# Patient Record
Sex: FEMALE
[De-identification: names, ages, dates, MRNs, and addresses within clinical notes are randomized per-mention and may not be internally consistent; named-entity substitution may affect disease eponyms.]

---

## 2021-04-21 ENCOUNTER — NURSE TRIAGE (OUTPATIENT)
Dept: OTHER | Facility: CLINIC | Age: 66
End: 2021-04-21

## 2021-04-21 NOTE — TELEPHONE ENCOUNTER
Brief description of triage: Pt reports having laser lithotripsy on 3/22/21. States she has lump that appeared on left side below left breast after procedure. States Urologist has been dismissive and she has been in contact with PCP, but unable to get appt. States pain is 9/10. States she is having trouble urinating completely and is having to get in the shower with warm water to fully urinate. States she is using a heating pad for mild relief of lump on left side. Triage indicates for patient to contact PCP to be seen today or go to ER. Care advice provided, patient verbalizes understanding; denies any other questions or concerns; instructed to call back for any new or worsening symptoms. This triage is a result of a call to [address] [place] [address]. Please do not respond to the triage nurse through this encounter. Any subsequent communication should be directly with the patient. Reason for Disposition   Patient sounds very sick or weak to the triager    Answer Assessment - Initial Assessment Questions  1. SYMPTOM: \"What's the main symptom you're concerned about? \" (e.g., frequency, incontinence)      Pea size mass approx 4 \" below left breast that appeared after lithotripsy. 2. ONSET: \"When did the mass start? \"      3/22/21    3. PAIN: \"Is there any pain? \" If so, ask: \"How bad is it? \" (Scale: 1-10; mild, moderate, severe)      9/10    4. CAUSE: \"What do you think is causing the symptoms? \"      Unsure if related to problems with lithotripsy. 5. OTHER SYMPTOMS: \"Do you have any other symptoms? \" (e.g., fever, flank pain, blood in urine, pain with urination)      Left flank pain    6. PREGNANCY: \"Is there any chance you are pregnant? \" \"When was your last menstrual period? \"      N/A    Protocols used: URINARY SYMPTOMS-ADULT-[place]